# Patient Record
Sex: MALE | Race: ASIAN
[De-identification: names, ages, dates, MRNs, and addresses within clinical notes are randomized per-mention and may not be internally consistent; named-entity substitution may affect disease eponyms.]

---

## 2017-02-06 ENCOUNTER — HOSPITAL ENCOUNTER (EMERGENCY)
Dept: HOSPITAL 61 - ER | Age: 37
Discharge: HOME | End: 2017-02-06
Payer: COMMERCIAL

## 2017-02-06 VITALS — SYSTOLIC BLOOD PRESSURE: 126 MMHG | DIASTOLIC BLOOD PRESSURE: 81 MMHG

## 2017-02-06 VITALS — WEIGHT: 160 LBS | BODY MASS INDEX: 26.66 KG/M2 | HEIGHT: 65 IN

## 2017-02-06 DIAGNOSIS — M25.572: Primary | ICD-10-CM

## 2017-02-06 NOTE — PHYS DOC
Past Medical History


Past Medical History:  No Pertinent History


Past Surgical History:  No Surgical History


Alcohol Use:  Occasionally


Drug Use:  None





Adult General


Chief Complaint


Chief Complaint:  LOWEREXTREMITY INJURY





HPI


HPI


Patient is a 37  year old male who presents with 1 out of 10 chronic sharp left 

medial ankle pain that began a year ago after work-related accident. Patient 

denies any new injuries. He states his pain is worse when ambulating.





Review of Systems


Review of Systems





Constitutional: Denies fever or chills []


Musculoskeletal: Left ankle pain


Integument: Denies rash or skin lesions []


Neurologic: Denies headache, focal weakness or sensory changes []


Endocrine: Denies polyuria or polydipsia []





Allergies


Allergies





 Allergies








Coded Allergies Type Severity Reaction Last Updated Verified


 


  No Known Drug Allergies    2/6/17 No











Physical Exam


Physical Exam





Constitutional: Well developed, well nourished, no acute distress, non-toxic 

appearance. []


Skin: Warm, dry, no erythema, no rash. [] 


Back: No tenderness, no CVA tenderness. [] 


Extremities: Left medial ankle with small amount of swelling which patient 

states is chronic. Tenderness on palpation of left medial ankle. Full range of 

motion to the left ankle. +2 left pedal pulse. Cap refill less than 2 seconds 

the left lower extremity. Sensation intact to the left lower extremity. Cap 

refill less than 2 seconds the left lower extremity.


Neurologic: Alert and oriented X 3, normal motor function, normal sensory 

function, no focal deficits noted. []


Psychologic: Affect normal, judgement normal, mood normal. []





Current Patient Data


Vital Signs





 Vital Signs








  Date Time  Temp Pulse Resp B/P Pulse Ox O2 Delivery O2 Flow Rate FiO2


 


2/6/17 20:00 98.3 77 16  99 Room Air  





 98.3       











EKG


EKG


[]





Radiology/Procedures


Radiology/Procedures


[]





Course & Med Decision Making


Course & Med Decision Making


Pertinent Labs and Imaging studies reviewed. (See chart for details)





Patient is in the ED with chronic left ankle pain. I recommended following up 

with orthopedic doctor which are provided in 7 to.Ace wrap was applied to the 

left ankle by the Ed RN, neurovascular exam done by me is normal.Cap refill <2 

seconds. Ice elevation encouraged. Discharged with Ultracet.





Dragon Disclaimer


Dragon Disclaimer


This electronic medical record was generated, in whole or in part, using a 

voice recognition dictation system.





Departure


Departure


Impression:  


 Primary Impression:  


 Ankle pain, left


Disposition:  01 HOME, SELF-CARE


Condition:  STABLE


Referrals:  


NO PCP (PCP)








EVELYNE CROOK MD


follow up with him in 7 days


Patient Instructions:  Ankle Pain





Additional Instructions:


You were seen for chronic ankle pain. We recommend you follow-up with the 

provided orthopedic doctor in the next 7 days. Ice and elevate the extremity. 

Take the prescribed medicines as ordered.


Scripts


Tramadol Hcl/Acetaminophen (Ultracet Tablet)1 Each Tablet1 Tab PO Q6HRS #30 TAB


   Prov:DEANNE AUBRTO         2/6/17





Problem Qualifiers








 Primary Impression:  


 Ankle pain, left


 Chronicity:  chronic  Qualified Code:  M25.572 - Pain in left ankle and joints 

of left foot





DEANNE ABURTO Feb 6, 2017 20:42

## 2020-06-03 ENCOUNTER — HOSPITAL ENCOUNTER (EMERGENCY)
Dept: HOSPITAL 61 - ER | Age: 40
Discharge: HOME | End: 2020-06-03
Payer: COMMERCIAL

## 2020-06-03 VITALS — HEIGHT: 67 IN | WEIGHT: 149.91 LBS | BODY MASS INDEX: 23.53 KG/M2

## 2020-06-03 VITALS — SYSTOLIC BLOOD PRESSURE: 118 MMHG | DIASTOLIC BLOOD PRESSURE: 70 MMHG

## 2020-06-03 DIAGNOSIS — B86: Primary | ICD-10-CM

## 2020-06-03 PROCEDURE — 99283 EMERGENCY DEPT VISIT LOW MDM: CPT

## 2020-06-03 NOTE — PHYS DOC
Past Medical History


Past Medical History:  No Pertinent History


Past Surgical History:  No Surgical History


Smoking Status:  Never Smoker


Alcohol Use:  Occasionally


Drug Use:  None





General Adult


EDM:


Chief Complaint:  ITCHING





HPI:


HPI:





Patient is a 40-year-old [place] speaking male who presents with a rash on his 

arms and the lateral aspect of his lower abdomen states it is very pruritic.  

Nobody else has the rash.  He does not remember getting into anything.  []





Review of Systems:


Review of Systems:


Constitutional:  Denies fever or chills. []


Eyes:  Denies change in visual acuity. []


HENT:  Denies nasal congestion or sore throat. [] 


Respiratory:  Denies cough or shortness of breath. [] 


Cardiovascular:  Denies chest pain or edema. [] 


GI:  Denies abdominal pain, nausea, vomiting, bloody stools or diarrhea. [] 


:  Denies dysuria. [] 


Musculoskeletal:  Denies back pain or joint pain. [] 


Integument: Per HPI. [] 


Neurologic:  Denies headache, focal weakness or sensory changes. [] 


Endocrine:  Denies polyuria or polydipsia. [] 


Lymphatic:  Denies swollen glands. [] 


Psychiatric:  Denies depression or anxiety. []





Heart Score:


Risk Factors:


Risk Factors:  DM, Current or recent (<one month) smoker, HTN, HLP, family 

history of CAD, obesity.


Risk Scores:


Score 0 - 3:  2.5% MACE over next 6 weeks - Discharge Home


Score 4 - 6:  20.3% MACE over next 6 weeks - Admit for Clinical Observation


Score 7 - 10:  72.7% MACE over next 6 weeks - Early Invasive Strategies





Allergies:


Allergies:





Allergies








Coded Allergies Type Severity Reaction Last Updated Verified


 


  No Known Drug Allergies    2/6/17 No











Physical Exam:


PE:





Constitutional: Well developed, well nourished, no acute distress, non-toxic 

appearance. []


HENT: Normocephalic, atraumatic, bilateral external ears normal, oropharynx 

moist, no oral exudates, nose normal. []


Eyes: PERRLA, EOMI, conjunctiva normal, no discharge. [] 


Neck: Normal range of motion, no tenderness, supple, no stridor. [] 


Cardiovascular:Heart rate regular rhythm, no murmur []


Lungs & Thorax:  Bilateral breath sounds clear to auscultation []


Abdomen: Bowel sounds normal, soft, no tenderness, no masses, no pulsatile 

masses. [] 


Skin: Scattered maculopapular rash with a linear aspect up his arms and 

bilateral flank area [] 


Back: No tenderness, no CVA tenderness. [] 


Extremities: No tenderness, no cyanosis, no clubbing, ROM intact, no edema. [] 


Neurologic: Alert and oriented X 3, normal motor function, normal sensory 

function, no focal deficits noted. []


Psychologic: Affect normal, judgement normal, mood normal. []





EKG:


EKG:


[]





Radiology/Procedures:


Radiology/Procedures:


[]





Course & Med Decision Making:


Course & Med Decision Making


Pertinent Labs and Imaging studies reviewed. (See chart for details)





[]





Dragon Disclaimer:


Dragon Disclaimer:


This electronic medical record was generated, in whole or in part, using a voice

 recognition dictation system.





Departure


Departure


Impression:  


   Primary Impression:  


   Scabies


Disposition:  01 HOME, SELF-CARE


Condition:  STABLE


Referrals:  


NO PCP (PCP)


Patient Instructions:  Scabies


Scripts


Prednisone (PREDNISONE) 20 Mg Tablet


1 TAB PO TID PRN for COUGH for 5 Days, #15 TAB


   Prov: DEB REYES DO         6/3/20 


Permethrin (ELIMITE) 60 Gm Cream..g.


1 AMBER TP ONCE, #60 GM 0 Refills


   massage into skin from head to soles of feet one time, leave on for 8-14 

hours then remove by thorough washing


   Prov: EDB REYES DO         6/3/20











DEB REYES DO              Ricky 3, 2020 10:38